# Patient Record
Sex: FEMALE | Race: WHITE | Employment: FULL TIME | URBAN - METROPOLITAN AREA
[De-identification: names, ages, dates, MRNs, and addresses within clinical notes are randomized per-mention and may not be internally consistent; named-entity substitution may affect disease eponyms.]

---

## 2020-03-18 ENCOUNTER — TRANSFERRED RECORDS (OUTPATIENT)
Dept: HEALTH INFORMATION MANAGEMENT | Facility: CLINIC | Age: 55
End: 2020-03-18

## 2020-06-17 ENCOUNTER — TRANSCRIBE ORDERS (OUTPATIENT)
Dept: OTHER | Age: 55
End: 2020-06-17

## 2020-06-17 DIAGNOSIS — L72.3 SEBACEOUS CYST: Primary | ICD-10-CM

## 2020-06-17 NOTE — TELEPHONE ENCOUNTER
Oncology/Surgical Oncology Referral Request:     Specialty Requested: Medical Oncology - Cancer Risk    Referring Provider: iTka Patel MD    Referring Clinic/Organization: Other - [facility]    Records location: No records received     Requested Provider (if specified): Not Specified

## 2020-06-18 NOTE — TELEPHONE ENCOUNTER
RECORDS STATUS - ALL OTHER DIAGNOSIS      RECORDS RECEIVED FROM: [facility]   DATE RECEIVED: 6/23/2020    NOTES STATUS DETAILS   OFFICE NOTE from referring provider     OFFICE NOTE from medical oncologist     DISCHARGE SUMMARY from hospital     DISCHARGE REPORT from the ER     OPERATIVE REPORT     MEDICATION LIST     CLINICAL TRIAL TREATMENTS TO DATE     LABS     PATHOLOGY REPORTS Complete- Bx Report from [facility] [place] - Emailed Records to HIM on 6/19/2020 and forwarded the email to Evelyn Cantor    ANYTHING RELATED TO DIAGNOSIS     GENONOMIC TESTING     TYPE:     IMAGING (NEED IMAGES & REPORT)     CT SCANS     MRI     MAMMO     ULTRASOUND     PET       Action    Action Taken 6/18/2020 8:34am     I called pt Macy- she said all her records are with [facility] [place] with Dr. Tika Patel. There's no imaging to collect just a path report. The small cyst was removed from her forehead.     I called [facility]. Phone: [phone] opt 2    I left a detailed vm requesting a call back.     9:46am   I called [facility] back again-   The surgery happened back in March- they are going to fax over the report shortly. Pt's last name was different in their system.

## 2020-06-23 ENCOUNTER — VIRTUAL VISIT (OUTPATIENT)
Dept: ONCOLOGY | Facility: CLINIC | Age: 55
End: 2020-06-23
Attending: GENETIC COUNSELOR, MS
Payer: COMMERCIAL

## 2020-06-23 ENCOUNTER — PRE VISIT (OUTPATIENT)
Dept: ONCOLOGY | Facility: CLINIC | Age: 55
End: 2020-06-23

## 2020-06-23 DIAGNOSIS — Z80.3 FAMILY HISTORY OF MALIGNANT NEOPLASM OF BREAST: ICD-10-CM

## 2020-06-23 DIAGNOSIS — D23.9 SEBACEOUS ADENOMA: Primary | ICD-10-CM

## 2020-06-23 DIAGNOSIS — Z84.89: ICD-10-CM

## 2020-06-23 PROCEDURE — 96040 ZZH GENETIC COUNSELING, EACH 30 MINUTES: CPT | Mod: 95,ZF | Performed by: GENETIC COUNSELOR, MS

## 2020-06-23 NOTE — LETTER
"    6/23/2020         RE: Macy Pfeiffer  [address]  [place] [address]        Dear Colleague,    Thank you for referring your patient, Macy Pfeiffer, to the [facility]. Please see a copy of my visit note below.    Macy Pfeiffer is a 55 year old female who is being evaluated via a billable video visit.      The patient has been notified of following:     \"This video visit will be conducted via a call between you and your physician/provider. We have found that certain health care needs can be provided without the need for an in-person physical exam.  This service lets us provide the care you need with a video conversation.  If a prescription is necessary we can send it directly to your pharmacy.  If lab work is needed we can place an order for that and you can then stop by our lab to have the test done at a later time.    Video visits are billed at different rates depending on your insurance coverage.  Please reach out to your insurance provider with any questions.\"    Patient has given verbal consent for Video visit? Yes    6/23/2020    Referring Provider: Tika Patel MD    Presenting Information:   I met with Macy Pfeiffer ([place]) today for genetic counseling at the Cancer Risk Management Program (virtual visit completed due to COVID-19 pandemic) to discuss her personal and family history of sebaceous adenoma.  She is here today to review this history, cancer screening recommendations, and available genetic testing options.    Personal History:  Macy is a 55 year old female.  Skin biopsy (forehead lesion) from 3/18/2020 was significant for a sebaceous adenoma.  She reports normal routine screening, including annual mammograms (most recent from 6/15/2020) and colonoscopy at age 52 (10 year follow up recommended).      Family History: (Please see scanned pedigree for detailed family history information)    One sister was recently diagnosed with a sebaceous adenoma at age " 60    Her mother was diagnosed with thyroid cancer in her 50's, and has a history of non-melanoma skin cancer/sun exposure    One maternal first cousin was diagnosed with breast cancer at age 49    Her maternal ethnicity is [place]. Her paternal ethnicity is Khmer/[place]i/Central [place].  There is no known Ashkenazi Voodoo ancestry on either side of her family..    Discussion:    Macy was referred to genetic counseling due to her personal and family history of sebaceous adenoma, to discuss the possibility of Angella-Gato/Badillo syndrome.    We discussed the natural history and genetics of Badillo syndrome. There are currently five genes known to cause Badillo Syndrome: MLH1, MSH2, MSH6, PMS2, and EPCAM.  A single mutation in one of the Badillo Syndrome genes increases the risk for colon, endometrial, ovarian, and stomach cancers.  Other cancers that can be seen in some families with Badillo Syndrome include pancreatic, bladder, biliary tract, urothelial, small bowel, prostate, breast, brain cancers, as well as sebaceous adenomas, sebaceous carcinomas and keratoacanthomas as seen in [place]-Gato syndrome (historical variant of Badillo syndrome).     A detailed handout regarding Badillo syndrome and the information we discussed can be found in the after visit summary. Topics included: inheritance pattern, cancer risks, cancer screening recommendations, and also risks, benefits and limitations of testing.    We discussed the role of immunohistochemistry (IHC) testing on sebaceous adenomas, as a tool to screen for Badillo syndrome/Angella-Gato syndrome.   Each mismatch repair gene (MLH1, MSH2, MSH6, PMS2) makes a protein.  IHC testing involves looking at the tumor to determine which of the proteins is present and which (if any) are absent.    If one or more of the mismatch repair proteins is absent in the tumor, it can indicate an underlying inherited mutation in the respective gene. In this way, IHC testing can help determine who is a candidate  for additional genetic blood testing for Badillo syndrome.  Furthermore, IHC testing guides which genetic test is ordered.   Of note, the sensitivity and specificity of this testing on sebaceous adenomas for Badillo syndrome is thought to be lower than on other tumors, such as colorectal and endometrial.  In addition, the false positive rate has been reported to be 56% (per NCCN).   Based on dermatology pathology records available during this visit, testing/results for IHC/MMR were not included.      Macy's personal and family history of sebaceous adenoma could be suggestive of Badillo syndrome, however we reviewed that her reported family history does not present with other features suggestive of this hereditary cancer syndrome.  With that being said, she has a small paternal family being that her father passed away at age 65 and did not have siblings, and her paternal grandmother passed away in her 50's.      We discussed available testing options, including reaching out to her dermatology clinic to request IHC for MMR/Badillo syndrome.  We also discussed the availability of genetic testing for Badillo syndrome, however it is currently not clear whether this would be covered by her insurance.      Macy expressed interest in genetic testing, and would prefer to proceed first with a prior authorization to clarify insurance coverage/out of pocket cost.  We reviewed available genetic testing options, including a targeted gene panel for Badillo syndrome, as well as expanded panel options to include additional genes related to other hereditary cancer syndromes. Macy expressed interest in learning as much information as possible from this test. Therefore, we discussed the CancerNext gene panel offered through [employer].    Genetic testing is available for 34 genes associated with hereditary cancer: CancerNext (APC, CHELSY, BARD1, BMPR1A, BRCA1, BRCA2, BRIP1, CDH1, CDK4, CDKN2A, CHEK2, DICER1, EPCAM, GREM1, HOXB13, MLH1,  MRE11A, MSH2, MSH6, MUTYH, NBN, NF1, PALB2, PMS2, POLD1, POLE, PTEN, RAD50, RAD51C, RAD51D, SMAD4, SMARCA4, STK11, and TP53).  We discussed that many of the genes in the CancerNext panel are associated with specific hereditary cancer syndromes and published management guidelines: Hereditary Breast and Ovarian Cancer syndrome (BRCA1, BRCA2), Badillo syndrome (MLH1, MSH2, MSH6, PMS2, EPCAM), Familial Adenomatous Polyposis (APC), Hereditary Diffuse Gastric Cancer (CDH1), Familial Atypical Multiple Mole Melanoma syndrome (CDK4, CDKN2A), Juvenile Polyposis syndrome (BMPR1A, SMAD4), Cowden syndrome (PTEN), Li Fraumeni syndrome (TP53), Peutz-Jeghers syndrome (STK11), MUTYH Associated Polyposis (MUTYH), and Neurofibromatosis type 1 (NF1).   The CHELSY, BRIP1, CHEK2, GREM1, NBN, PALB2, POLD1, POLE, RAD51C, and RAD51D genes are associated with increased cancer risk and have published management guidelines for certain cancers.    The remaining genes (BARD1, DICER1, HOXB13, MRE11A, RAD50, and SMARCA4) are associated with increased cancer risk and may allow us to make medical recommendations when mutations are identified.      Medical Management: For Macy, we reviewed that the information from genetic testing may determine:    additional cancer screening for which Macy may qualify (i.e. mammogram and breast MRI, more frequent colonoscopies, more frequent dermatologic exams, etc.),    options for risk reducing surgeries Macy could consider (i.e. bilateral mastectomy, surgery to remove her ovaries and/or uterus, etc.),      and targeted chemotherapies for certain cancers should they be indicated in the future (i.e. immunotherapy for individuals with Badillo syndrome, PARP inhibitors, etc.).     These recommendations will be discussed in detail once genetic testing is completed.     Plan:  1) Today Macy elected to pursue a benefits analysis through [employer] to better understand her out of pocket cost for the CancerNext  34- gene panel.  2) Macy will be contacted with the expected out of pocket cost when available.  3) If Macy is comfortable with the estimated out of pocket cost and elects to proceed with genetic testing, consent will be obtained at a follow up visit.      Evelyn Cantor MS, [facility]  Licensed, Certified Genetic Counselor  [facility]  Phone: [phone]      Video-Visit Details    Type of service:  Video Visit    Video Start Time: 06/23/2020 09:02 am   Stop: 06/23/2020 09:54 am       Originating Location (pt. Location): Home    Distant Location (provider location):  [facility] -home    Platform used for Video Visit: [employer]        Again, thank you for allowing me to participate in the care of your patient.        Sincerely,        Evelyn Cantor, GC

## 2020-06-23 NOTE — LETTER
Cancer Risk Management  Program Locations    [facility]  [facility]  [facility]  [facility]  [facility]  Mailing Address  Cancer Risk Management Program  [facility]  [address]  [address] [address] [facility] [address]  [place] [address]    New patient appointments  [phone]  June 26, 2020    Macy Orellanakeyontammy  [address]  [place] [address]      Dear Macy,    It was a pleasure meeting with you on 6/23/2020. Here is a copy of the progress note from your recent genetic counseling visit to the Cancer Risk Management Program. If you have any additional questions, please feel free to call.    Referring Provider: Tika Patel MD    Presenting Information:   I met with Macy Margarette ([place]) today for genetic counseling at the Cancer Risk Management Program (virtual visit completed due to COVID-19 pandemic) to discuss her personal and family history of sebaceous adenoma.  She is here today to review this history, cancer screening recommendations, and available genetic testing options.    Personal History:  Macy is a 55 year old female.  Skin biopsy (forehead lesion) from 3/18/2020 was significant for a sebaceous adenoma.  She reports normal routine screening, including annual mammograms (most recent from 6/15/2020) and colonoscopy at age 52 (10 year follow up recommended).      Family History: (Please see scanned pedigree for detailed family history information)    One sister was recently diagnosed with a sebaceous adenoma at age 60    Her mother was diagnosed with thyroid cancer in her 50's, and has a history of non-melanoma skin cancer/sun exposure    One maternal first cousin was diagnosed with breast cancer at age 49    Her maternal ethnicity is [place]. Her paternal ethnicity is Kosovan/Algerian/Sami.  There is no known Ashkenazi Anabaptist ancestry on either side of her  family..    Discussion:    Macy was referred to genetic counseling due to her personal and family history of sebaceous adenoma, to discuss the possibility of Angella-Gato/Badillo syndrome.    We discussed the natural history and genetics of Badillo syndrome. There are currently five genes known to cause Badillo Syndrome: MLH1, MSH2, MSH6, PMS2, and EPCAM.  A single mutation in one of the Badillo Syndrome genes increases the risk for colon, endometrial, ovarian, and stomach cancers.  Other cancers that can be seen in some families with Badillo Syndrome include pancreatic, bladder, biliary tract, urothelial, small bowel, prostate, breast, brain cancers, as well as sebaceous adenomas, sebaceous carcinomas and keratoacanthomas as seen in [place]-Gato syndrome (historical variant of Badillo syndrome).     A detailed handout regarding Badillo syndrome and the information we discussed can be found in the after visit summary. Topics included: inheritance pattern, cancer risks, cancer screening recommendations, and also risks, benefits and limitations of testing.    We discussed the role of immunohistochemistry (IHC) testing on sebaceous adenomas, as a tool to screen for Badillo syndrome/Angella-Gato syndrome.   Each mismatch repair gene (MLH1, MSH2, MSH6, PMS2) makes a protein.  IHC testing involves looking at the tumor to determine which of the proteins is present and which (if any) are absent.    If one or more of the mismatch repair proteins is absent in the tumor, it can indicate an underlying inherited mutation in the respective gene. In this way, IHC testing can help determine who is a candidate for additional genetic blood testing for Badillo syndrome.  Furthermore, IHC testing guides which genetic test is ordered.   Of note, the sensitivity and specificity of this testing on sebaceous adenomas for Badillo syndrome is thought to be lower than on other tumors, such as colorectal and endometrial.  In addition, the false positive rate has  been reported to be 56% (per NCCN).   Based on dermatology pathology records available during this visit, testing/results for IHC/MMR were not included.      Macy's personal and family history of sebaceous adenoma could be suggestive of Badillo syndrome, however we reviewed that her reported family history does not present with other features suggestive of this hereditary cancer syndrome.  With that being said, she has a small paternal family being that her father passed away at age 65 and did not have siblings, and her paternal grandmother passed away in her 50's.      We discussed available testing options, including reaching out to her dermatology clinic to request IHC for MMR/Badillo syndrome.  We also discussed the availability of genetic testing for Badillo syndrome, however it is currently not clear whether this would be covered by her insurance.      Macy expressed interest in genetic testing, and would prefer to proceed first with a prior authorization to clarify insurance coverage/out of pocket cost.  We reviewed available genetic testing options, including a targeted gene panel for Badillo syndrome, as well as expanded panel options to include additional genes related to other hereditary cancer syndromes. Macy expressed interest in learning as much information as possible from this test. Therefore, we discussed the CancerNext gene panel offered through [employer].    Genetic testing is available for 34 genes associated with hereditary cancer: CancerNext (APC, CHELSY, BARD1, BMPR1A, BRCA1, BRCA2, BRIP1, CDH1, CDK4, CDKN2A, CHEK2, DICER1, EPCAM, GREM1, HOXB13, MLH1, MRE11A, MSH2, MSH6, MUTYH, NBN, NF1, PALB2, PMS2, POLD1, POLE, PTEN, RAD50, RAD51C, RAD51D, SMAD4, SMARCA4, STK11, and TP53).  We discussed that many of the genes in the CancerNext panel are associated with specific hereditary cancer syndromes and published management guidelines: Hereditary Breast and Ovarian Cancer syndrome (BRCA1, BRCA2),  Badillo syndrome (MLH1, MSH2, MSH6, PMS2, EPCAM), Familial Adenomatous Polyposis (APC), Hereditary Diffuse Gastric Cancer (CDH1), Familial Atypical Multiple Mole Melanoma syndrome (CDK4, CDKN2A), Juvenile Polyposis syndrome (BMPR1A, SMAD4), Cowden syndrome (PTEN), Li Fraumeni syndrome (TP53), Peutz-Jeghers syndrome (STK11), MUTYH Associated Polyposis (MUTYH), and Neurofibromatosis type 1 (NF1).   The CHELSY, BRIP1, CHEK2, GREM1, NBN, PALB2, POLD1, POLE, RAD51C, and RAD51D genes are associated with increased cancer risk and have published management guidelines for certain cancers.    The remaining genes (BARD1, DICER1, HOXB13, MRE11A, RAD50, and SMARCA4) are associated with increased cancer risk and may allow us to make medical recommendations when mutations are identified.      Medical Management: For Macy, we reviewed that the information from genetic testing may determine:    additional cancer screening for which Macy may qualify (i.e. mammogram and breast MRI, more frequent colonoscopies, more frequent dermatologic exams, etc.),    options for risk reducing surgeries Macy could consider (i.e. bilateral mastectomy, surgery to remove her ovaries and/or uterus, etc.),      and targeted chemotherapies for certain cancers should they be indicated in the future (i.e. immunotherapy for individuals with Badillo syndrome, PARP inhibitors, etc.).     These recommendations will be discussed in detail once genetic testing is completed.     Plan:  1) Today Macy elected to pursue a benefits analysis through [employer] to better understand her out of pocket cost for the CancerNext 34- gene panel.  2) Macy will be contacted with the expected out of pocket cost when available.  3) If Macy is comfortable with the estimated out of pocket cost and elects to proceed with genetic testing, consent will be obtained at a follow up visit.      Evelyn Cantor MS, [facility]  Licensed, Certified Genetic Counselor  [facility]  [place]  Phone: [phone]

## 2020-06-23 NOTE — PROGRESS NOTES
"Macy Pfeiffer is a 55 year old female who is being evaluated via a billable video visit.      The patient has been notified of following:     \"This video visit will be conducted via a call between you and your physician/provider. We have found that certain health care needs can be provided without the need for an in-person physical exam.  This service lets us provide the care you need with a video conversation.  If a prescription is necessary we can send it directly to your pharmacy.  If lab work is needed we can place an order for that and you can then stop by our lab to have the test done at a later time.    Video visits are billed at different rates depending on your insurance coverage.  Please reach out to your insurance provider with any questions.\"    Patient has given verbal consent for Video visit? Yes    6/23/2020    Referring Provider: Tika Patel MD    Presenting Information:   I met with Macy Pfeiffer (Black) today for genetic counseling at the Cancer Risk Management Program (virtual visit completed due to COVID-19 pandemic) to discuss her personal and family history of sebaceous adenoma.  She is here today to review this history, cancer screening recommendations, and available genetic testing options.    Personal History:  Macy is a 55 year old female.  Skin biopsy (forehead lesion) from 3/18/2020 was significant for a sebaceous adenoma.  She reports normal routine screening, including annual mammograms (most recent from 6/15/2020) and colonoscopy at age 52 (10 year follow up recommended).      Family History: (Please see scanned pedigree for detailed family history information)    One sister was recently diagnosed with a sebaceous adenoma at age 60    Her mother was diagnosed with thyroid cancer in her 50's, and has a history of non-melanoma skin cancer/sun exposure    One maternal first cousin was diagnosed with breast cancer at age 49    Her maternal ethnicity is [place]. Her paternal " ethnicity is Maltese/French/[place].  There is no known Ashkenazi Latter day ancestry on either side of her family..    Discussion:    Macy was referred to genetic counseling due to her personal and family history of sebaceous adenoma, to discuss the possibility of Randolph-Gato/Badillo syndrome.    We discussed the natural history and genetics of Badillo syndrome. There are currently five genes known to cause Badillo Syndrome: MLH1, MSH2, MSH6, PMS2, and EPCAM.  A single mutation in one of the Badillo Syndrome genes increases the risk for colon, endometrial, ovarian, and stomach cancers.  Other cancers that can be seen in some families with Badillo Syndrome include pancreatic, bladder, biliary tract, urothelial, small bowel, prostate, breast, brain cancers, as well as sebaceous adenomas, sebaceous carcinomas and keratoacanthomas as seen in Angella-Gato syndrome (historical variant of Badillo syndrome).     A detailed handout regarding Badillo syndrome and the information we discussed can be found in the after visit summary. Topics included: inheritance pattern, cancer risks, cancer screening recommendations, and also risks, benefits and limitations of testing.    We discussed the role of immunohistochemistry (IHC) testing on sebaceous adenomas, as a tool to screen for Badillo syndrome/Randolph-Gato syndrome.   Each mismatch repair gene (MLH1, MSH2, MSH6, PMS2) makes a protein.  IHC testing involves looking at the tumor to determine which of the proteins is present and which (if any) are absent.    If one or more of the mismatch repair proteins is absent in the tumor, it can indicate an underlying inherited mutation in the respective gene. In this way, IHC testing can help determine who is a candidate for additional genetic blood testing for Badillo syndrome.  Furthermore, IHC testing guides which genetic test is ordered.   Of note, the sensitivity and specificity of this testing on sebaceous adenomas for Badillo syndrome is thought to be lower  than on other tumors, such as colorectal and endometrial.  In addition, the false positive rate has been reported to be 56% (per NCCN).   Based on dermatology pathology records available during this visit, testing/results for IHC/MMR were not included.      Macy's personal and family history of sebaceous adenoma could be suggestive of Badillo syndrome, however we reviewed that her reported family history does not present with other features suggestive of this hereditary cancer syndrome.  With that being said, she has a small paternal family being that her father passed away at age 65 and did not have siblings, and her paternal grandmother passed away in her 50's.      We discussed available testing options, including reaching out to her dermatology clinic to request IHC for MMR/Badillo syndrome.  We also discussed the availability of genetic testing for Badillo syndrome, however it is currently not clear whether this would be covered by her insurance.      Macy expressed interest in genetic testing, and would prefer to proceed first with a prior authorization to clarify insurance coverage/out of pocket cost.  We reviewed available genetic testing options, including a targeted gene panel for Badillo syndrome, as well as expanded panel options to include additional genes related to other hereditary cancer syndromes. Macy expressed interest in learning as much information as possible from this test. Therefore, we discussed the CancerNext gene panel offered through [facility].    Genetic testing is available for 34 genes associated with hereditary cancer: CancerNext (APC, CHELSY, BARD1, BMPR1A, BRCA1, BRCA2, BRIP1, CDH1, CDK4, CDKN2A, CHEK2, DICER1, EPCAM, GREM1, HOXB13, MLH1, MRE11A, MSH2, MSH6, MUTYH, NBN, NF1, PALB2, PMS2, POLD1, POLE, PTEN, RAD50, RAD51C, RAD51D, SMAD4, SMARCA4, STK11, and TP53).  We discussed that many of the genes in the CancerNext panel are associated with specific hereditary cancer syndromes  and published management guidelines: Hereditary Breast and Ovarian Cancer syndrome (BRCA1, BRCA2), Badillo syndrome (MLH1, MSH2, MSH6, PMS2, EPCAM), Familial Adenomatous Polyposis (APC), Hereditary Diffuse Gastric Cancer (CDH1), Familial Atypical Multiple Mole Melanoma syndrome (CDK4, CDKN2A), Juvenile Polyposis syndrome (BMPR1A, SMAD4), Cowden syndrome (PTEN), Li Fraumeni syndrome (TP53), Peutz-Jeghers syndrome (STK11), MUTYH Associated Polyposis (MUTYH), and Neurofibromatosis type 1 (NF1).   The CHELSY, BRIP1, CHEK2, GREM1, NBN, PALB2, POLD1, POLE, RAD51C, and RAD51D genes are associated with increased cancer risk and have published management guidelines for certain cancers.    The remaining genes (BARD1, DICER1, HOXB13, MRE11A, RAD50, and SMARCA4) are associated with increased cancer risk and may allow us to make medical recommendations when mutations are identified.      Medical Management: For Macy, we reviewed that the information from genetic testing may determine:    additional cancer screening for which Macy may qualify (i.e. mammogram and breast MRI, more frequent colonoscopies, more frequent dermatologic exams, etc.),    options for risk reducing surgeries Macy could consider (i.e. bilateral mastectomy, surgery to remove her ovaries and/or uterus, etc.),      and targeted chemotherapies for certain cancers should they be indicated in the future (i.e. immunotherapy for individuals with Badillo syndrome, PARP inhibitors, etc.).     These recommendations will be discussed in detail once genetic testing is completed.     Plan:  1) Today Macy elected to pursue a benefits analysis through [employer] to better understand her out of pocket cost for the CancerNext 34- gene panel.  2) Macy will be contacted with the expected out of pocket cost when available.  3) If Macy is comfortable with the estimated out of pocket cost and elects to proceed with genetic testing, consent will be obtained at a  follow up visit.      Evelyn Cantor MS, CGC  Licensed, Certified Genetic Counselor  [facility]  Phone: [phone]      Video-Visit Details    Type of service:  Video Visit    Video Start Time: 06/23/2020 09:02 am   Stop: 06/23/2020 09:54 am       Originating Location (pt. Location): Home    Distant Location (provider location):  [facility]    Platform used for Video Visit: Génesis

## 2020-06-26 NOTE — PATIENT INSTRUCTIONS
Assessing Cancer Risk  Only about 5-10% of cancers are thought to be due to an inherited cancer susceptibility gene.    These families often have:    Several people with the same or related types of cancer    Cancers diagnosed at a young age (before age 50)    Individuals with more than one primary cancer    Multiple generations of the family affected with cancer    Badillo Syndrome Genes  We each inherit two copies of every gene in our bodies: one from our mother and one from our father.  Each gene has a specific job to do.  When a gene has a mistake or  mutation  in it, it does not work like it should. There are currently five genes known to cause Badillo Syndrome: MLH1, MSH2, MSH6, PMS2, and EPCAM.  A single mutation in one of the Badillo Syndrome genes increases the risk for colon, endometrial, ovarian, and stomach cancers.  Other cancers that can be seen in some families with Badillo Syndrome include pancreatic, bladder, biliary tract, urothelial, small bowel, prostate, breast and brain cancers.  The table below lists the chance that a person with Badillo syndrome would develop cancer in his or her lifetime1.      Lifetime Cancer Risks    General Population Badillo Syndrome   Colon 4.5% 12-52%   Endometrial 2.7% Up to 57%   Stomach <1% Up to 16%   Ovarian 1.3% Up to 38%     Inheriting a mutation does not mean a person will develop cancer, but it does significantly increase his or her risk above the general population risk.    Inheritance   Badillo Syndrome mutations are inherited in an autosomal dominant pattern.  This means that if a parent has a mutation, each of his or her children will have a 50% chance of inheriting that same mutation.  Therefore, each child--male or female--would have a 50% chance of being at increased risk for developing cancer.          Image obtained from Genetics Home Reference, 2013     Tumor Screening for Badillo Syndrome  There are two different tests that can be done on a person s colon or  endometrial tumor as an initial screen for Badillo Syndrome. These tests are called IHC (immunohistochemistry) and MSI (microsatellite instability). Tumors that show an absent protein on IHC or an unstable MSI result could be due to a mutation in one of the known Badillo Syndrome genes. The IHC results can also guide further genetic testing for Badillo Syndrome by indicating which gene should be tested.     Genetic Testing  Genetic testing involves a blood test and will look at the genetic information in the Badillo Syndrome genes for any harmful mutations that are associated with increased cancer risk.  If possible, it is recommended that the person(s) who has had cancer be tested first before other family members.  That person will give us the most useful information about whether or not a specific gene is associated with the cancer in the family.    Results  There are three possible results of Badillo Syndrome genetic testing:    Positive--a harmful mutation was identified     Negative--no mutation was identified     Variant of unknown significance--a variation in one of the genes was identified, but it is unclear how this impacts cancer risk in the family    Advantages and Disadvantages  There are advantages and disadvantages to genetic testing of these genes.    Advantages    May clarify your cancer risk    Can help you make medical decisions    May explain the cancers in your family    May give useful information to your family members (if you share your results)    Disadvantages    Possible negative emotional impact of learning about inherited cancer risk    Uncertainty in interpreting a negative test result in some situations    Possible genetic discrimination concerns (see below)    Genetic Information Nondiscrimination Act (MACI)  MACI is a federal law that protects individuals from health insurance or employment discrimination based on a genetic test result alone.  Although rare, there are currently no legal  protections in terms of life insurance, long term care, or disability insurances.  Visit the National Human Genome Research [place] genome.gov/08436584 to learn more.    Reducing Cancer Risk  Current screening recommendations for people with a Badillo Syndrome mutation include1:    Colorectal: Colonoscopy beginning at age 20-25 (or earlier based on family history); repeated every 1-2 years     Endometrial/Ovarian:   o Consider surgery to remove uterus, ovaries, and fallopian tubes. Timing of surgery can depend on multiple factors, such as whether childbearing is complete, comorbidities, family history, etc. There is not enough evidence to make specific recommendations for removal of the ovaries in women with MSH6 and PMS2 mutations.   o Talk to your doctor about possible endometrial biopsy, transvaginal ultrasound, and CA-125 blood test screening for endometrial and ovarian cancer. There are limitations to this type of screening.    Stomach: Screening for stomach cancer is largely dependent on certain factors, such as family history and ethnicity. This is due to the lack of supporting data for stomach cancer screening. Upper endoscopy (EGD with extended duodenoscopy) beginning at age 40 every 3-5 years can be considered.    Urinary Tract: Consider annual urinalysis starting at 30-35. This recommendation is individualized based on family history, but should be considered in those who have a mutation in MSH2 (especially males).     Brain: Annual physical examination beginning at age 25-30.     Prostate: There is currently not enough evidence to recommend specific prostate cancer screening for men with Badillo syndrome. However, men are encouraged to speak to their physicians about participating in NCCN guidelines for prostate cancer screening.    Depending upon the mutation in the family, dermatology evaluation or prostate screening may be recommended.  Early detection and prevention are primary goals of screening for  colorectal cancer.  These recommendations may change based on the specific gene mutation in the family.     Questions to Think About Regarding Genetic Testing    What effect will the test result have on me and my relationship with my family members if I have an inherited gene mutation?  If I don t have a gene mutation?    Should I share my test results, and how will my family react to this news, which may also affect them?    Are my children ready to learn new information that may one day affect their own health?    Resources  Badillo Syndrome International lynchcancers.com   Badillo Syndrome Screening Network lynchscreening.net   American Cancer Society (ACS) cancer.org   National Cancer [place] (NCI) cancer.gov     Please call us if you have any questions or concerns.   Cancer Risk Management Program [phone] ([phone])  ? Mauro Gonzalez, MS, [facility] [phone]  ? Claudia Lancaster, MS, [facility]  [phone]  ? Evelyn Cantor, MS, [facility]  [phone]  ? Kimberley Quintero, MS, [facility] [phone]  ? Oralia Wayne, MS, [facility] [phone]  ? Zoey Samaniego, MS, [facility]  [phone]  ? Lilian Costello, MS, [facility]  [phone]    References  1. National Comprehensive Cancer Network. Clinical practice guidelines in oncology, colorectal cancer screening. Available online (registration required). 2019.

## 2020-09-18 ENCOUNTER — VIRTUAL VISIT (OUTPATIENT)
Dept: ONCOLOGY | Facility: CLINIC | Age: 55
End: 2020-09-18
Attending: GENETIC COUNSELOR, MS
Payer: COMMERCIAL

## 2020-09-18 DIAGNOSIS — D23.9 SEBACEOUS ADENOMA: Primary | ICD-10-CM

## 2020-09-18 DIAGNOSIS — Z80.3 FAMILY HISTORY OF MALIGNANT NEOPLASM OF BREAST: ICD-10-CM

## 2020-09-18 DIAGNOSIS — Z84.89: ICD-10-CM

## 2020-09-18 PROCEDURE — 96040 ZZH GENETIC COUNSELING, EACH 30 MINUTES: CPT | Mod: 95

## 2020-09-18 NOTE — LETTER
9/18/2020         RE: Macy Pfeiffer  [address]  [place] [address]        Dear Colleague,    Thank you for referring your patient, Macy Pfeiffer, to the [facility]. Please see a copy of my visit note below.    9/18/2020    Referring Provider: Tika Patel MD    Presenting Information:   I met with Macy today over video visit to review genetic testing for hereditary cancer. She had previously been seen as part of the Cancer Risk Management Program to discuss her personal and family history of sebaceous adenoma.     Discussion:    We previously reviewed the details of Macy's family and personal history. Please see the clinic note from our previous appointment for details.     Verbal consent was obtained and genetic testing for the CancerNext test was sent to [facility]. Of note, testing has been updated from the 34 gene to 36 gene CancerNext panel offered through [facility].  A saliva kit has been requested and set from [facility] directly to Macy for sample collection.  Test turn around time: 3-4 weeks  Genetic testing is available for 36 genes associated with hereditary cancer: CancerNext (APC, CHELSY, AXIN2, BARD1, BMPR1A, BRCA1, BRCA2, BRIP1, CDH1, CDK4, CDKN2A, CHEK2, DICER1, EPCAM, GREM1, HOXB13, MLH1, MSH2, MSH3, MSH6, MUTYH, NBN, NF1, NTHL1, PALB2, PMS2, POLD1, POLE, PTEN, RAD51C, RAD51D, RECQL, SMAD4, SMARCA4, STK11, and TP53).  We discussed that many of the genes in the CancerNext panel are associated with specific hereditary cancer syndromes and published management guidelines: Hereditary Breast and Ovarian Cancer syndrome (BRCA1, BRCA2), Badillo syndrome (MLH1, MSH2, MSH6, PMS2, EPCAM), Familial Adenomatous Polyposis (APC), Hereditary Diffuse Gastric Cancer (CDH1), Familial Atypical Multiple Mole Melanoma syndrome (CDK4, CDKN2A), Juvenile Polyposis syndrome (BMPR1A, SMAD4), Cowden syndrome (PTEN), Li Fraumeni syndrome (TP53), Peutz-Jeghers  syndrome (STK11), MUTYH Associated Polyposis (MUTYH), and Neurofibromatosis type 1 (NF1).   The CHELSY, AXIN2, BRIP1, CHEK2, GREM1, MSH3, NBN, NTHL1, PALB2, POLD1, POLE, RAD51C, and RAD51D genes are associated with increased cancer risk and have published management guidelines for certain cancers.    The remaining genes (BARD1, DICER1, HOXB13, RECQL, and SMARCA4) are associated with increased cancer risk and may allow us to make medical recommendations when mutations are identified.      Medical Management: For Macy, we reviewed that the information from genetic testing may determine:  ? additional cancer screening for which Macy may qualify (i.e. mammogram and breast MRI, more frequent colonoscopies, more frequent dermatologic exams, etc.),  ? options for risk reducing surgeries Macy could consider (i.e. bilateral mastectomy, surgery to remove her ovaries and/or uterus, etc.),    ? and targeted chemotherapies for certain cancers should they be indicated in the future (i.e. immunotherapy for individuals with Badillo syndrome, PARP inhibitors, etc.).   ? These recommendations will be discussed in detail once genetic testing is completed.     Plan:  1)  Today Macy elected to proceed with the CancerNext gene panel offered through [facility].  2)  All of Macy's questions were answered to her satisfaction.   3)  Verbal consent was obtained for the CancerNext panel, and a saliva kit will be mailed directly by [facility]  4)  I will call Macy with the results once they become available. Turn around time: 3-4 weeks     Time spent: 30 minutes    Evelyn Cantor MS, [facility]  Licensed, Certified Genetic Counselor  [facility]  Phone: [phone]

## 2020-09-18 NOTE — PROGRESS NOTES
9/18/2020    Referring Provider: Tika Patel MD    Presenting Information:   I met with Macy today over video visit to review genetic testing for hereditary cancer. She had previously been seen as part of the Cancer Risk Management Program to discuss her personal and family history of sebaceous adenoma.     Discussion:    We previously reviewed the details of Macy's family and personal history. Please see the clinic note from our previous appointment for details.     Verbal consent was obtained and genetic testing for the CancerNext test was sent to [facility]. Of note, testing has been updated from the 34 gene to 36 gene CancerNext panel offered through [facility].  A saliva kit has been requested and set from [facility] directly to Macy for sample collection.  Test turn around time: 3-4 weeks  Genetic testing is available for 36 genes associated with hereditary cancer: CancerNext (APC, CHELSY, AXIN2, BARD1, BMPR1A, BRCA1, BRCA2, BRIP1, CDH1, CDK4, CDKN2A, CHEK2, DICER1, EPCAM, GREM1, HOXB13, MLH1, MSH2, MSH3, MSH6, MUTYH, NBN, NF1, NTHL1, PALB2, PMS2, POLD1, POLE, PTEN, RAD51C, RAD51D, RECQL, SMAD4, SMARCA4, STK11, and TP53).  We discussed that many of the genes in the CancerNext panel are associated with specific hereditary cancer syndromes and published management guidelines: Hereditary Breast and Ovarian Cancer syndrome (BRCA1, BRCA2), Badillo syndrome (MLH1, MSH2, MSH6, PMS2, EPCAM), Familial Adenomatous Polyposis (APC), Hereditary Diffuse Gastric Cancer (CDH1), Familial Atypical Multiple Mole Melanoma syndrome (CDK4, CDKN2A), Juvenile Polyposis syndrome (BMPR1A, SMAD4), Cowden syndrome (PTEN), Li Fraumeni syndrome (TP53), Peutz-Jeghers syndrome (STK11), MUTYH Associated Polyposis (MUTYH), and Neurofibromatosis type 1 (NF1).   The CHELSY, AXIN2, BRIP1, CHEK2, GREM1, MSH3, NBN, NTHL1, PALB2, POLD1, POLE, RAD51C, and RAD51D genes are associated with increased cancer risk and have published  management guidelines for certain cancers.    The remaining genes (BARD1, DICER1, HOXB13, RECQL, and SMARCA4) are associated with increased cancer risk and may allow us to make medical recommendations when mutations are identified.      Medical Management: For Macy, we reviewed that the information from genetic testing may determine:  ? additional cancer screening for which Macy may qualify (i.e. mammogram and breast MRI, more frequent colonoscopies, more frequent dermatologic exams, etc.),  ? options for risk reducing surgeries Macy could consider (i.e. bilateral mastectomy, surgery to remove her ovaries and/or uterus, etc.),    ? and targeted chemotherapies for certain cancers should they be indicated in the future (i.e. immunotherapy for individuals with Badillo syndrome, PARP inhibitors, etc.).   ? These recommendations will be discussed in detail once genetic testing is completed.     Plan:  1)  Today Macy elected to proceed with the CancerNext gene panel offered through [facility].  2)  All of Macy's questions were answered to her satisfaction.   3)  Verbal consent was obtained for the CancerNext panel, and a saliva kit will be mailed directly by [facility]  4)  I will call Macy with the results once they become available. Turn around time: 3-4 weeks     Time spent: 30 minutes    Evelyn Cantor MS, CGC  Licensed, Certified Genetic Counselor  [facility]  Phone: [phone]

## 2020-10-07 DIAGNOSIS — Z84.89: ICD-10-CM

## 2020-10-07 DIAGNOSIS — D23.9 SEBACEOUS ADENOMA: ICD-10-CM

## 2020-10-07 DIAGNOSIS — Z80.3 FAMILY HISTORY OF MALIGNANT NEOPLASM OF BREAST: ICD-10-CM

## 2020-11-07 ENCOUNTER — HEALTH MAINTENANCE LETTER (OUTPATIENT)
Age: 55
End: 2020-11-07

## 2020-11-16 LAB
LAB SCANNED RESULT: NORMAL
MISCELLANEOUS TEST: NORMAL

## 2020-12-02 ENCOUNTER — VIRTUAL VISIT (OUTPATIENT)
Dept: ONCOLOGY | Facility: CLINIC | Age: 55
End: 2020-12-02
Attending: GENETIC COUNSELOR, MS
Payer: COMMERCIAL

## 2020-12-02 DIAGNOSIS — Z80.3 FAMILY HISTORY OF MALIGNANT NEOPLASM OF BREAST: ICD-10-CM

## 2020-12-02 DIAGNOSIS — Z84.89: ICD-10-CM

## 2020-12-02 DIAGNOSIS — D23.9 SEBACEOUS ADENOMA: Primary | ICD-10-CM

## 2020-12-02 PROCEDURE — 999N000069 HC STATISTIC GENETIC COUNSELING, < 16 MIN: Mod: GT | Performed by: GENETIC COUNSELOR, MS

## 2020-12-02 NOTE — PROGRESS NOTES
"12/2/2020    Referring Provider: Tika Patel MD    Presenting Information:  I met with Macy today (virtual visit) to discuss her genetic testing results as part of the Cancer Risk Management Program.     Genetic Testing Result: NEGATIVE  Macy is negative for mutations in the AIP, ALK, APC, CHELSY, AXIN2, BAP1, BARD1, BLM, BMPR1A, BRCA1, BRCA2, BRIP1, CDC73, CDH1, CDK4, CDKN1B, CDKN2A, CHEK2, CTNNA1, DICER1, EPCAM, EGFR, EGLN1, FANCC, FH, FLCN, GALNT12, GREM1, HOXB13, KIF1B, KIT, LZTR1, MAX, MEN1, MET, MITF, MLH1, MRE11, MSH2, MSH3, MSH6, MUTYH, NBN, NF1, NF2, NTHL1, PALB2, PDGFRA, PHOX2B, PMS2, POLD1, POLE, POT1, TDDTD0J, PTCH1, PTEN, RAD50, RAD51C, RAD51D, RB1, RECQL, RET, SDHA, SDHAF2, SDHB, SDHC, SDHD, SMAD4, SMARCA4, SMARCB1, SMARCE1, STK11, SUFU, [id number], TP53, TSC1, TSC2, VHL, and XRCC2 genes. No mutations were found in any of the 77 genes analyzed. This test involved sequencing and deletion/duplication analysis of all genes with the exceptions of VHL and XRCC2 (sequencing and deletion/duplication); EGFR, EGLN1, HOXB13, KIT, MITF, PDGFRA, POLD1 and POLE (sequencing only); EPCAM and GREM1 (deletion/duplication only).     No pathogenic mutations, variants of unknown significance, or gross deletions or duplications were detected in any of the 77 genes analyzed by this test ([facility] Accession #: [id number]).        A copy of the test report can be found in the Laboratory tab, dated 10/7/2020, and named \"[facility]\". The report is scanned in as a linked document.    Interpretation:  We discussed several different interpretations of this negative test result.    1. One explanation may be that there is a different gene or combination of genes and environment that are associated with the cancers/tumors in this family.  2. It is possible that a cancer/tumor susceptibility gene is present in Macy's family which she did not inherit.    3. There is also a small possibility that there is a mutation in one of " these genes, and the testing laboratory could not find it with their current testing methods.       Screening:  Based on this negative test result, it is important for Macy and her relatives to refer back to the family history for appropriate cancer screening.  Population cancer screening options, such as those recommended by the American Cancer Society and the National Comprehensive Cancer Network (NCCN), are likely appropriate for Macy at this time based on her current family history and negative genetic test result. These screening recommendations may change if there are changes to Macy's personal and/or family history of cancer. Final screening recommendations should be made by each individual's managing physician.    Inheritance:  We reviewed the autosomal dominant inheritance of these 77 genes. We discussed that Macy cannot/did not pass on an identifiable mutation in these genes to her children based on this test result.  Mutations in these genes do not skip generations.      Additional Testing Considerations:  Although Macy's genetic testing result was negative, other relatives may still carry a gene mutation associated with sebaceous adenoma and/or breast cancer. Genetic counseling is an available option for close relatives to discuss the likelihood of a cancer susceptibility gene being present, and review cancer/tumor screening recommendations.  If any of these relatives do pursue genetic testing, Macy is encouraged to contact me so that we may review the impact of their test results on her.    Summary:  We do not have an explanation for Macy's personal and family history of sebaceous adenoma, or family history of cancer. While no genetic changes were identified, Macy and her close relatives may still be at risk for certain cancers due to family history, environmental factors, or other genetic causes not identified by this test.  Because of that, it is important that she continue  with cancer screening based on her personal and family history as discussed above.    Genetic testing is rapidly advancing, and new cancer susceptibility genes will most likely be identified in the future.  Therefore, I encouraged Macy to contact me annually or if there are changes in her personal or family history.  This may change how we assess her cancer risk, screening, and the testing we would offer.    Plan:  1. A copy of this results letter will be available to Macy through [employer].  2. She plans to follow-up with managing physicians.  3. She should contact me annually, or sooner if her family history changes.    If Macy has any further questions, I encouraged her to contact me at [phone].    Time spent on video call: 5 minutes.    Evelyn Cantor MS, [facility]  Licensed, Certified Genetic Counselor  [facility]  Phone: [phone]

## 2020-12-02 NOTE — LETTER
"    12/2/2020         RE: Macy Pfeiffer  [address]  [place] [address]        Dear Colleague,    Thank you for referring your patient, Macy Pfeiffer, to the [facility]. Please see a copy of my visit note below.    12/2/2020    Referring Provider: Tika Patel MD    Presenting Information:  I met with Macy today (virtual visit) to discuss her genetic testing results as part of the Cancer Risk Management Program.     Genetic Testing Result: NEGATIVE  Macy is negative for mutations in the AIP, ALK, APC, CHELSY, AXIN2, BAP1, BARD1, BLM, BMPR1A, BRCA1, BRCA2, BRIP1, CDC73, CDH1, CDK4, CDKN1B, CDKN2A, CHEK2, CTNNA1, DICER1, EPCAM, EGFR, EGLN1, FANCC, FH, FLCN, GALNT12, GREM1, HOXB13, KIF1B, KIT, LZTR1, MAX, MEN1, MET, MITF, MLH1, MRE11, MSH2, MSH3, MSH6, MUTYH, NBN, NF1, NF2, NTHL1, PALB2, PDGFRA, PHOX2B, PMS2, POLD1, POLE, POT1, WXZPH2S, PTCH1, PTEN, RAD50, RAD51C, RAD51D, RB1, RECQL, RET, SDHA, SDHAF2, SDHB, SDHC, SDHD, SMAD4, SMARCA4, SMARCB1, SMARCE1, STK11, SUFU, [id number], TP53, TSC1, TSC2, VHL, and XRCC2 genes. No mutations were found in any of the 77 genes analyzed. This test involved sequencing and deletion/duplication analysis of all genes with the exceptions of VHL and XRCC2 (sequencing and deletion/duplication); EGFR, EGLN1, HOXB13, KIT, MITF, PDGFRA, POLD1 and POLE (sequencing only); EPCAM and GREM1 (deletion/duplication only).     No pathogenic mutations, variants of unknown significance, or gross deletions or duplications were detected in any of the 77 genes analyzed by this test ([facility] Accession #: [id number]).        A copy of the test report can be found in the Laboratory tab, dated 10/7/2020, and named \"SEND OUTS MISC TEST\". The report is scanned in as a linked document.    Interpretation:  We discussed several different interpretations of this negative test result.    1. One explanation may be that there is a different gene or combination of genes and " environment that are associated with the cancers/tumors in this family.  2. It is possible that a cancer/tumor susceptibility gene is present in Macy's family which she did not inherit.    3. There is also a small possibility that there is a mutation in one of these genes, and the testing laboratory could not find it with their current testing methods.       Screening:  Based on this negative test result, it is important for Mayc and her relatives to refer back to the family history for appropriate cancer screening.  Population cancer screening options, such as those recommended by the American Cancer Society and the National Comprehensive Cancer Network (NCCN), are likely appropriate for Macy at this time based on her current family history and negative genetic test result. These screening recommendations may change if there are changes to Macy's personal and/or family history of cancer. Final screening recommendations should be made by each individual's managing physician.    Inheritance:  We reviewed the autosomal dominant inheritance of these 77 genes. We discussed that Macy cannot/did not pass on an identifiable mutation in these genes to her children based on this test result.  Mutations in these genes do not skip generations.      Additional Testing Considerations:  Although Macy's genetic testing result was negative, other relatives may still carry a gene mutation associated with sebaceous adenoma and/or breast cancer. Genetic counseling is an available option for close relatives to discuss the likelihood of a cancer susceptibility gene being present, and review cancer/tumor screening recommendations.  If any of these relatives do pursue genetic testing, Macy is encouraged to contact me so that we may review the impact of their test results on her.    Summary:  We do not have an explanation for Macy's personal and family history of sebaceous adenoma, or family history of cancer. While  no genetic changes were identified, Macy and her close relatives may still be at risk for certain cancers due to family history, environmental factors, or other genetic causes not identified by this test.  Because of that, it is important that she continue with cancer screening based on her personal and family history as discussed above.    Genetic testing is rapidly advancing, and new cancer susceptibility genes will most likely be identified in the future.  Therefore, I encouraged Macy to contact me annually or if there are changes in her personal or family history.  This may change how we assess her cancer risk, screening, and the testing we would offer.    Plan:  1. A copy of this results letter will be available to Macy through [employer].  2. She plans to follow-up with managing physicians.  3. She should contact me annually, or sooner if her family history changes.    If Macy has any further questions, I encouraged her to contact me at [phone].    Time spent on video call: 5 minutes.    Evelyn Cantor MS, [facility]  Licensed, Certified Genetic Counselor  [facility]  Phone: [phone]

## 2020-12-02 NOTE — LETTER
Cancer Risk Management  Program Locations    [facility]  [facility]  [facility]  Mailing Address  Cancer Risk Management Program  [facility]  [address]  [place] [address]    New patient appointments  [phone]  December 9, 2020    Macy Pfeiffer  [address]  [place] [address]      Dear Macy,    It was a pleasure speaking with you on 12/2/2020. Here is a copy of the progress note from our discussion. If you have any additional questions, please feel free to call.    Referring Provider: Tika Patel MD    Presenting Information:  I met with Macy today (virtual visit) to discuss her genetic testing results as part of the Cancer Risk Management Program.     Genetic Testing Result: NEGATIVE  Macy is negative for mutations in the AIP, ALK, APC, CHELSY, AXIN2, BAP1, BARD1, BLM, BMPR1A, BRCA1, BRCA2, BRIP1, CDC73, CDH1, CDK4, CDKN1B, CDKN2A, CHEK2, CTNNA1, DICER1, EPCAM, EGFR, EGLN1, FANCC, FH, FLCN, GALNT12, GREM1, HOXB13, KIF1B, KIT, LZTR1, MAX, MEN1, MET, MITF, MLH1, MRE11, MSH2, MSH3, MSH6, MUTYH, NBN, NF1, NF2, NTHL1, PALB2, PDGFRA, PHOX2B, PMS2, POLD1, POLE, POT1, JJSHK2U, PTCH1, PTEN, RAD50, RAD51C, RAD51D, RB1, RECQL, RET, SDHA, SDHAF2, SDHB, SDHC, SDHD, SMAD4, SMARCA4, SMARCB1, SMARCE1, STK11, SUFU, [id number], TP53, TSC1, TSC2, VHL, and XRCC2 genes. No mutations were found in any of the 77 genes analyzed. This test involved sequencing and deletion/duplication analysis of all genes with the exceptions of VHL and XRCC2 (sequencing and deletion/duplication); EGFR, EGLN1, HOXB13, KIT, MITF, PDGFRA, POLD1 and POLE (sequencing only); EPCAM and GREM1 (deletion/duplication only).     No pathogenic mutations, variants of unknown significance, or gross deletions or duplications were detected in any of the 77 genes  analyzed by this test ([facility] Accession #: [id number]).         Interpretation:  We discussed several different interpretations of this negative test result.    1. One explanation may be that there is a different gene or combination of genes and environment that are associated with the cancers/tumors in this family.  2. It is possible that a cancer/tumor susceptibility gene is present in Macy's family which she did not inherit.    3. There is also a small possibility that there is a mutation in one of these genes, and the testing laboratory could not find it with their current testing methods.       Screening:  Based on this negative test result, it is important for Macy and her relatives to refer back to the family history for appropriate cancer screening.  Population cancer screening options, such as those recommended by the American Cancer Society and the National Comprehensive Cancer Network (NCCN), are likely appropriate for Macy at this time based on her current family history and negative genetic test result. These screening recommendations may change if there are changes to Macy's personal and/or family history of cancer. Final screening recommendations should be made by each individual's managing physician.    Inheritance:  We reviewed the autosomal dominant inheritance of these 77 genes. We discussed that Macy cannot/did not pass on an identifiable mutation in these genes to her children based on this test result.  Mutations in these genes do not skip generations.      Additional Testing Considerations:  Although Macy's genetic testing result was negative, other relatives may still carry a gene mutation associated with sebaceous adenoma and/or breast cancer. Genetic counseling is an available option for close relatives to discuss the likelihood of a cancer susceptibility gene being present, and review cancer/tumor screening recommendations.  If any of these relatives do pursue genetic  testing, Macy is encouraged to contact me so that we may review the impact of their test results on her.    Summary:  We do not have an explanation for Macy's personal and family history of sebaceous adenoma, or family history of cancer. While no genetic changes were identified, Macy and her close relatives may still be at risk for certain cancers due to family history, environmental factors, or other genetic causes not identified by this test.  Because of that, it is important that she continue with cancer screening based on her personal and family history as discussed above.    Genetic testing is rapidly advancing, and new cancer susceptibility genes will most likely be identified in the future.  Therefore, I encouraged Macy to contact me annually or if there are changes in her personal or family history.  This may change how we assess her cancer risk, screening, and the testing we would offer.    Plan:  1. A copy of this results letter will be available to Macy through [employer].  2. She plans to follow-up with managing physicians.  3. She should contact me annually, or sooner if her family history changes.    If Macy has any further questions, I encouraged her to contact me at [phone].    Evelyn Cantor MS, [facility]  Licensed, Certified Genetic Counselor  [facility]  Phone: [phone]

## 2021-09-05 ENCOUNTER — HEALTH MAINTENANCE LETTER (OUTPATIENT)
Age: 56
End: 2021-09-05

## 2021-12-26 ENCOUNTER — HEALTH MAINTENANCE LETTER (OUTPATIENT)
Age: 56
End: 2021-12-26

## 2022-10-23 ENCOUNTER — HEALTH MAINTENANCE LETTER (OUTPATIENT)
Age: 57
End: 2022-10-23

## 2023-06-01 ENCOUNTER — HEALTH MAINTENANCE LETTER (OUTPATIENT)
Age: 58
End: 2023-06-01